# Patient Record
Sex: FEMALE | Race: WHITE | URBAN - METROPOLITAN AREA
[De-identification: names, ages, dates, MRNs, and addresses within clinical notes are randomized per-mention and may not be internally consistent; named-entity substitution may affect disease eponyms.]

---

## 2023-11-06 ENCOUNTER — OFFICE (OUTPATIENT)
Dept: URBAN - METROPOLITAN AREA CLINIC 103 | Facility: CLINIC | Age: 53
Setting detail: OPHTHALMOLOGY
End: 2023-11-06
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
## 2023-11-06 PROCEDURE — NO SHOW FE NO SHOW FEE: Performed by: OPHTHALMOLOGY

## 2024-02-05 ENCOUNTER — OFFICE (OUTPATIENT)
Dept: URBAN - METROPOLITAN AREA CLINIC 103 | Facility: CLINIC | Age: 54
Setting detail: OPHTHALMOLOGY
End: 2024-02-05
Payer: COMMERCIAL

## 2024-02-05 DIAGNOSIS — H43.813: ICD-10-CM

## 2024-02-05 DIAGNOSIS — H35.033: ICD-10-CM

## 2024-02-05 PROBLEM — H43.812 POSTERIOR VITREOUS DETACHMENT; RIGHT EYE, LEFT EYE: Status: ACTIVE | Noted: 2024-02-05

## 2024-02-05 PROBLEM — H43.811 POSTERIOR VITREOUS DETACHMENT; RIGHT EYE, LEFT EYE: Status: ACTIVE | Noted: 2024-02-05

## 2024-02-05 PROCEDURE — 92014 COMPRE OPH EXAM EST PT 1/>: CPT | Performed by: OPHTHALMOLOGY

## 2024-02-05 PROCEDURE — 92134 CPTRZ OPH DX IMG PST SGM RTA: CPT | Performed by: OPHTHALMOLOGY

## 2024-02-05 ASSESSMENT — CONFRONTATIONAL VISUAL FIELD TEST (CVF)
OS_FINDINGS: FULL
OD_FINDINGS: FULL

## 2024-02-05 ASSESSMENT — REFRACTION_AUTOREFRACTION
OD_CYLINDER: -0.50
OS_SPHERE: -3.25
OD_AXIS: 42
OS_AXIS: 140
OS_CYLINDER: -0.50
OD_SPHERE: -3.25

## 2024-02-05 ASSESSMENT — SPHEQUIV_DERIVED
OS_SPHEQUIV: -3.5
OD_SPHEQUIV: -3.5

## 2024-05-31 ENCOUNTER — NON-APPOINTMENT (OUTPATIENT)
Age: 54
End: 2024-05-31

## 2024-06-06 PROBLEM — Z00.00 ENCOUNTER FOR PREVENTIVE HEALTH EXAMINATION: Status: ACTIVE | Noted: 2024-06-06

## 2024-06-17 ENCOUNTER — APPOINTMENT (OUTPATIENT)
Dept: PULMONOLOGY | Facility: CLINIC | Age: 54
End: 2024-06-17
Payer: COMMERCIAL

## 2024-06-17 ENCOUNTER — NON-APPOINTMENT (OUTPATIENT)
Age: 54
End: 2024-06-17

## 2024-06-17 VITALS
DIASTOLIC BLOOD PRESSURE: 72 MMHG | SYSTOLIC BLOOD PRESSURE: 110 MMHG | HEIGHT: 62 IN | OXYGEN SATURATION: 98 % | HEART RATE: 79 BPM | WEIGHT: 141 LBS | TEMPERATURE: 97.1 F | BODY MASS INDEX: 25.95 KG/M2

## 2024-06-17 DIAGNOSIS — Z86.59 PERSONAL HISTORY OF OTHER MENTAL AND BEHAVIORAL DISORDERS: ICD-10-CM

## 2024-06-17 DIAGNOSIS — Z86.69 PERSONAL HISTORY OF OTHER DISEASES OF THE NERVOUS SYSTEM AND SENSE ORGANS: ICD-10-CM

## 2024-06-17 DIAGNOSIS — U07.1 COVID-19: ICD-10-CM

## 2024-06-17 DIAGNOSIS — Z87.891 PERSONAL HISTORY OF NICOTINE DEPENDENCE: ICD-10-CM

## 2024-06-17 DIAGNOSIS — Z87.898 PERSONAL HISTORY OF OTHER SPECIFIED CONDITIONS: ICD-10-CM

## 2024-06-17 DIAGNOSIS — R91.1 SOLITARY PULMONARY NODULE: ICD-10-CM

## 2024-06-17 DIAGNOSIS — Z78.9 OTHER SPECIFIED HEALTH STATUS: ICD-10-CM

## 2024-06-17 PROCEDURE — 94060 EVALUATION OF WHEEZING: CPT

## 2024-06-17 PROCEDURE — 94729 DIFFUSING CAPACITY: CPT

## 2024-06-17 PROCEDURE — ZZZZZ: CPT

## 2024-06-17 PROCEDURE — 99204 OFFICE O/P NEW MOD 45 MIN: CPT | Mod: 25

## 2024-06-17 PROCEDURE — 94727 GAS DIL/WSHOT DETER LNG VOL: CPT

## 2024-06-17 RX ORDER — CHLORHEXIDINE GLUCONATE 4 %
5 LIQUID (ML) TOPICAL
Refills: 0 | Status: ACTIVE | COMMUNITY

## 2024-06-17 RX ORDER — AMITRIPTYLINE HYDROCHLORIDE 50 MG/1
50 TABLET, FILM COATED ORAL
Refills: 0 | Status: ACTIVE | COMMUNITY

## 2024-06-17 RX ORDER — CALCIUM CARBONATE/VITAMIN D3 600 MG-10
TABLET ORAL
Refills: 0 | Status: ACTIVE | COMMUNITY

## 2024-06-17 RX ORDER — LISINOPRIL 20 MG/1
20 TABLET ORAL
Refills: 0 | Status: ACTIVE | COMMUNITY

## 2024-06-17 RX ORDER — MEMANTINE HYDROCHLORIDE 10 MG/1
10 TABLET, FILM COATED ORAL
Refills: 0 | Status: ACTIVE | COMMUNITY

## 2024-06-17 NOTE — PROCEDURE
[FreeTextEntry1] : CAT scan abdomen done at [facility] May 2024 5 mm lower lobe nodule  Pulmonary function test June 17, 2024 no obstructive or restrictive lung disease.

## 2024-06-17 NOTE — HISTORY OF PRESENT ILLNESS
[Former] : former [Current] : current [Never] : never [Obstructive Sleep Apnea] : obstructive sleep apnea [CPAP:] : CPAP [Full Face mask] : full face mask [TextBox_4] : 53 female quit tobacco 6 y ago   2 ppd max for 10 y but less than that for years +vaping nicotine  Presents today bec ct chest nodule  feels good no sob no cough no wheeze no chest pain no tightness full activity   no wt loss no hemoptysis   obstructive sleep apnea a nd use cpap and  followed neurology occ  [TextBox_11] : 1 [TextBox_13] : 10 [YearQuit] : 2018 [TextBox_158] : [employer]

## 2024-06-17 NOTE — REASON FOR VISIT
[Initial] : an initial visit [Abnormal CXR/ Chest CT] : an abnormal CXR/ chest CT [Sleep Apnea] : sleep apnea [Pulmonary Nodules] : pulmonary nodules [TextEntry] : Patient had a CT scan with Neurology and accidently found a Pulmonary noddle.  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  She does have short term memory issues.  Patient does not have any Pulmonary complaints.  She does have sleep apnea; she uses a CPAP.  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 EUGENE

## 2024-06-17 NOTE — DISCUSSION/SUMMARY
[FreeTextEntry1] : Ms. Ortiz has a distant history of tobacco use.  She denies all pulmonary symptoms.  She recently was in the hospital for coffee-ground emesis and a CT abdomen showed a 5 mm nodule in the lower lobe.  The patient needs a full CAT scan of the chest.  Based on these results further recommendations will be made.  I did discuss with her that 5 mm nodules usually are nonspecific and are lymph nodes.  Usually in a smoker follow-up CAT scan in 1 year is indicated.  Final recommendations will be made when the CAT results are available The patient understands and agrees with plan of care. Today's office visit encompassed 46 minutes. I conducted an extensive history,physical exam and reviewed diagnosis and treatment options including diagnostic tests,radiology studies including cat scans and the use of prescription medication.

## 2024-06-25 ENCOUNTER — NON-APPOINTMENT (OUTPATIENT)
Age: 54
End: 2024-06-25

## 2024-07-05 ENCOUNTER — APPOINTMENT (OUTPATIENT)
Dept: PULMONOLOGY | Facility: CLINIC | Age: 54
End: 2024-07-05